# Patient Record
Sex: MALE | Race: WHITE | Employment: FULL TIME | URBAN - METROPOLITAN AREA
[De-identification: names, ages, dates, MRNs, and addresses within clinical notes are randomized per-mention and may not be internally consistent; named-entity substitution may affect disease eponyms.]

---

## 2017-04-18 ENCOUNTER — HOSPITAL ENCOUNTER (EMERGENCY)
Facility: CLINIC | Age: 32
Discharge: LEFT BEFORE DISCHARGE | End: 2017-04-18
Attending: PHYSICIAN ASSISTANT | Admitting: PHYSICIAN ASSISTANT
Payer: COMMERCIAL

## 2017-04-18 VITALS
HEART RATE: 83 BPM | TEMPERATURE: 97.3 F | WEIGHT: 230 LBS | RESPIRATION RATE: 18 BRPM | OXYGEN SATURATION: 99 % | DIASTOLIC BLOOD PRESSURE: 101 MMHG | SYSTOLIC BLOOD PRESSURE: 158 MMHG

## 2017-04-18 DIAGNOSIS — R07.89 CHEST PRESSURE: ICD-10-CM

## 2017-04-18 PROCEDURE — 93005 ELECTROCARDIOGRAM TRACING: CPT

## 2017-04-18 PROCEDURE — 99284 EMERGENCY DEPT VISIT MOD MDM: CPT

## 2017-04-18 ASSESSMENT — ENCOUNTER SYMPTOMS
RHINORRHEA: 0
FEVER: 0
COUGH: 0
NAUSEA: 0
SHORTNESS OF BREATH: 1
SORE THROAT: 0
LIGHT-HEADEDNESS: 0

## 2017-04-18 NOTE — LETTER
[facility] EMERGENCY DEPARTMENT  [address]  [facility] [address]  Phone: [phone]  Fax: [phone]    April 18, 2017        Jacek Ta  [address]  [place] [address]          To whom it may concern:    RE: Jacek Ta    Please excuse Jacek from work yesterday and today due to his discomfort.    Please contact me for questions or concerns.      Sincerely,  María Horner PA-C

## 2017-04-18 NOTE — ED NOTES
Pt presents to ED with midsternal chest pressure that began yesterday. States worse with a deep breath. Denies cardiac hx. ABCs intact. A/Ox3

## 2017-04-18 NOTE — ED PROVIDER NOTES
History     Chief Complaint:  Chest Pressure      HPI   Due to language barrier, a Cameroonian phone  was used during the history-taking and subsequent discussion with this patient.   Jacek Ta is a 31 year old male who presents with chest pressure.  The patient reports midsternal chest pressure which makes it difficult for him to take a full breath.  His symptoms started yesterday and has been constant since then although as the day progressed today, his symptoms have gradually improved.  He denies any actual pain in his chest.  He has no personal or family history of heart disease.  He has had no recent cough, fevers, or upper respiratory symptoms.  He did have a long car ride recently.  He states he really just came in for medication to treat his symptoms and is not interested in other evaluation since he is feeling better.    Cardiac/PE/DVT Risk Factors:  The patient has no history of hypertension, hyperlipidemia or diabetes and is not a smoker.  He reports no family history of heart disease.  He denies any personal or familial history of PE, DVT or clotting disorder.  He has had a long car trip recently.  He reports no recent surgery or other immobilizations.  He is not on hormone therapy and has no known malignancy.     Allergies:  No known drug allergies.     Medications:    The patient is currently on no regular medications.      Past Medical History:    History reviewed.  No significant past medical history.      Past Surgical History:    History reviewed. No pertinent past surgical history.     Family History:    History reviewed. No pertinent family history.     Social History:  Presents to the ED alone.  Tobacco Use: No previous or current tobacco use.  Alcohol Use: Occasional alcohol use.      Review of Systems   Constitutional: Negative for fever.   HENT: Negative for congestion, rhinorrhea and sore throat.    Respiratory: Positive for shortness of breath. Negative for cough.     Cardiovascular: Negative for chest pain.        Positive for chest pressure.   Gastrointestinal: Negative for nausea.   Neurological: Negative for light-headedness.   All other systems reviewed and are negative.    Physical Exam     Patient Vitals for the past 24 hrs:   BP Temp Temp src Pulse Resp SpO2 Weight   04/18/17 1658 (!) 158/101 - - - - 99 % -   04/18/17 1550 (!) 155/101 97.3  F (36.3  C) Temporal 83 18 99 % 104.3 kg (230 lb)        Physical Exam  Nursing note and vitals reviewed.     GENERAL: Alert, mild distress, non toxic appearing.   HEENT: Normal conjunctiva. No scleral icterus. MMM.   NECK: Supple.  CARDIAC: Normal rate and regular rhythm. Normal heart sounds. No murmurs, rubs, or gallops appreciated.  PULMONARY: CTA bilaterally. Normal breath sounds. No wheezing, crackles, or rhonchi appreciated.  ABDOMEN: Soft, non distended abdomen. Non-tender. No rebound or guarding.   NEURO: Alert and oriented. Non-focal.   MUSCULOSKELETAL: Normal range of motion. No peripheral edema. No calf tenderness bilaterally.   SKIN: Skin is warm and dry. No rashes. No pallor or jaundice.   PSYCH: Normal affect and mood.      Emergency Department Course   ECG:  @ 1557  Indication: chest pressure  Vent. Rate 87 bpm. OR interval 154 ms. QRS duration 90 ms. QT/QTc 382/459 ms. P-R-T axis 33 45 34.   Normal sinus rhythm.  Normal ECG.      Emergency Department Course:  EKG was done in triage, interpretation as above.     Nursing notes and vitals reviewed.  I performed an exam of the patient as documented above.     The patient refused further evaluation and left AMA.      Impression & Plan      Medical Decision Making:  Jacek Ta is a 31 year old male who presents with chest pressure but then refused further evaluation in the emergency department as he was feeling much improved.  I discussed with him, using the Guamanian phone , that without further evaluation I cannot rule out a serious or  life-threatening etiology such as acute coronary syndrome, myocardial infarction, pulmonary embolism, acute aortic dissection, myocarditis, pericarditis, acute valvular insufficiency amongst others.  The patient acknowledged this and still desired to leave, stating that he would return to have the recommended testing done if his symptoms return.  In my opinion this patient does have capacity to decide to leave Against Medical Advice, and I have asked him to sign a form indicating that decision.  I have invited the patient to return here at any time if he decides to have further evaluation or treatment, regardless of his ability to pay.     Diagnosis:    ICD-10-CM    1. Chest pressure R07.89    2. Elevated blood pressure I10      Disposition:  Left AMA    Discharge Medications:  None      I, Humera Cole, am serving as a scribe on 4/18/2017 at 4:56 PM to personally document services performed by María Horner PA-C based on my observations and the provider's statements to me.   Humera Cole  4/18/2017   [facility] EMERGENCY DEPARTMENT       María Horner PA-C  04/18/17 1913

## 2017-04-18 NOTE — DISCHARGE INSTRUCTIONS
I cannot rule out life threatening things related to heart including heart attack or blood clot in lungs.   You left before further work up.

## 2017-04-18 NOTE — ED NOTES
Pt left AMA, pt offered to not leave and be evaluated again by provider pt refused. Pt was given work note per provider approval.

## 2017-04-19 LAB — INTERPRETATION ECG - MUSE: NORMAL

## 2017-05-02 ENCOUNTER — HOSPITAL ENCOUNTER (EMERGENCY)
Facility: CLINIC | Age: 32
Discharge: HOME | End: 2017-05-02
Attending: EMERGENCY MEDICINE | Admitting: EMERGENCY MEDICINE
Payer: COMMERCIAL

## 2017-05-02 VITALS
SYSTOLIC BLOOD PRESSURE: 156 MMHG | OXYGEN SATURATION: 98 % | RESPIRATION RATE: 18 BRPM | TEMPERATURE: 98.3 F | DIASTOLIC BLOOD PRESSURE: 98 MMHG | HEART RATE: 90 BPM

## 2017-05-02 DIAGNOSIS — R19.7 DIARRHEA, UNSPECIFIED TYPE: ICD-10-CM

## 2017-05-02 PROCEDURE — 25000125 ZZHC RX 250: Performed by: EMERGENCY MEDICINE

## 2017-05-02 PROCEDURE — 99283 EMERGENCY DEPT VISIT LOW MDM: CPT

## 2017-05-02 PROCEDURE — 25000132 ZZH RX MED GY IP 250 OP 250 PS 637: Performed by: EMERGENCY MEDICINE

## 2017-05-02 RX ORDER — LOPERAMIDE HCL 2 MG
4 CAPSULE ORAL ONCE
Status: COMPLETED | OUTPATIENT
Start: 2017-05-02 | End: 2017-05-02

## 2017-05-02 RX ORDER — SODIUM CHLORIDE 9 MG/ML
1000 INJECTION, SOLUTION INTRAVENOUS CONTINUOUS
Status: DISCONTINUED | OUTPATIENT
Start: 2017-05-02 | End: 2017-05-02 | Stop reason: HOSPADM

## 2017-05-02 RX ORDER — ONDANSETRON 4 MG/1
4 TABLET, ORALLY DISINTEGRATING ORAL EVERY 8 HOURS PRN
Qty: 10 TABLET | Refills: 0 | Status: SHIPPED | OUTPATIENT
Start: 2017-05-02 | End: 2017-05-05

## 2017-05-02 RX ORDER — KETOROLAC TROMETHAMINE 15 MG/ML
15 INJECTION, SOLUTION INTRAMUSCULAR; INTRAVENOUS ONCE
Status: DISCONTINUED | OUTPATIENT
Start: 2017-05-02 | End: 2017-05-02 | Stop reason: HOSPADM

## 2017-05-02 RX ORDER — LOPERAMIDE HYDROCHLORIDE 2 MG/1
TABLET ORAL
Qty: 10 TABLET | Refills: 0 | Status: SHIPPED | OUTPATIENT
Start: 2017-05-02

## 2017-05-02 RX ORDER — ONDANSETRON 4 MG/1
4 TABLET, ORALLY DISINTEGRATING ORAL ONCE
Status: COMPLETED | OUTPATIENT
Start: 2017-05-02 | End: 2017-05-02

## 2017-05-02 RX ORDER — ONDANSETRON 2 MG/ML
4 INJECTION INTRAMUSCULAR; INTRAVENOUS
Status: DISCONTINUED | OUTPATIENT
Start: 2017-05-02 | End: 2017-05-02 | Stop reason: HOSPADM

## 2017-05-02 RX ADMIN — LOPERAMIDE HYDROCHLORIDE 4 MG: 2 CAPSULE ORAL at 11:37

## 2017-05-02 RX ADMIN — ONDANSETRON 4 MG: 4 TABLET, ORALLY DISINTEGRATING ORAL at 10:30

## 2017-05-02 ASSESSMENT — ENCOUNTER SYMPTOMS
DIARRHEA: 1
ABDOMINAL PAIN: 1
VOMITING: 1
NAUSEA: 1

## 2017-05-02 NOTE — ED PROVIDER NOTES
History     Chief Complaint:  Nausea, Vomiting, & Diarrhea    The history is provided by the patient. A  was used (phone ).      Jacek Ta is a 31 year old male who presents with nausea, vomiting, and diarrhea.  Patient had onset of nausea with one episode of emesis and diarrhea yesterday.  He notes having 20 episodes of diarrhea with his last episode being at 0900 today.  The diarrhea does seem to be slowing down through the course of this morning.  He subsequently developed diffuse abdominal pain rated at 1/10 in severity.  He denies fevers, chills, recent antibiotic use, recent foreign travel, or other complaint.     Allergies:  No known drug allergies      Medications:    The patient is not currently taking any prescribed medications.     Past Medical History:    The patient does not have any past pertinent medical history.     Past Surgical History:    History reviewed. No pertinent surgical history.     Family History:    History reviewed. No pertinent family history.      Social History:  Presents alone  Nepali speaking only   Tobacco use: Never  Alcohol use: Positive  PCP: None    Marital Status:         Review of Systems   Gastrointestinal: Positive for abdominal pain, diarrhea, nausea and vomiting.   All other systems reviewed and are negative.      Physical Exam     Patient Vitals for the past 24 hrs:   BP Temp Temp src Pulse Resp SpO2   05/02/17 1152 - - - - 18 -   05/02/17 0936 (!) 156/98 98.3  F (36.8  C) Temporal 90 16 98 %       Physical Exam  General: Sitting on gurney, appears comfortable.  HEENT:   The scalp and head appear normal    The pupils are equal, round, and reactive to light    Extraocular muscles are intact.    The nose is normal.    Dry oral mucosa.       Uvula is in the midline.  There is no peritonsillar abscess.  Neck:  Normal range of motion.    Lungs:  Clear.      No rales, no wheezing.      There is no tachypnea.   Non-labored.  Cardiac: Regular rate.      Normal S1 and S2.      No S3 or S4.      No pathological murmur.      No pericardial rub.  Abdomen: Soft. No distension. No tympani. No rebound. Non-tender.  Lymph: No anterior or posterior cervical lymphadenopathy noted.  MS:  Normal tone.      Normal movement of all extremities.    Neuro:  Normal mentation.  No focal motor or sensory changes.      Speech normal.  Psych:  Awake.     Alert.      Normal affect.      Appropriate interactions.  Skin:  No rash.      No lesions.         Emergency Department Course   Interventions:  1030: Zofran-ODT 4 mg PO   1137: Imodium 4 mg PO    Emergency Department Course:  Past medical records, nursing notes, and vitals reviewed.  1040: I performed an exam of the patient and obtained history as documented above.    Above workup undertaken.  1130: Nurse reported patient is refusing IV and labs requesting only something for diarrhea and a work note.   1133: I rechecked the patient.  Abdomen remains soft and nontender on reexamination.  Findings and plan explained to the Patient. Patient discharged home with instructions regarding supportive care, medications, and reasons to return. The importance of close follow-up was reviewed.      Impression & Plan    Medical Decision Making:  Jacek Ta is a 31 year old male coming in with nausea, vomiting, and diarrhea and was asking for something for nausea and diarrhea as well as a work note.  Patient with a nontender abdomen, he looked well.  He denies cramping or abdominal pain.  I was concerned about the amount of diarrhea he had as he said he went 20 times and wanted to get stool studies and provide him fluids as he looked mildly dehydrated but he refused this stating he only wanted something for the diarrhea and nausea and then a work slip.  I went back in and talked with him some more and reexamined him, he continues to be pain free and has not had anymore diarrhea while here.  I did  give him one dose of Imodium here and gave him 10 tablets to take at home.  He is not to take more than 8 in a 24 hour period and use as directed.  He had no bloody stools that would make me concerned for e-coli 0157 toxin.  I told him that if he develops new symptoms or issues he should return here, otherwise he is to follow up with his primary medical doctor in 48 hours if not fully resolved.    Diagnosis:    ICD-10-CM    1. Diarrhea, unspecified type R19.7        Disposition:  Discharged to home with plan as outlined.    Discharge Medications:  Discharge Medication List as of 5/2/2017 11:43 AM      START taking these medications    Details   loperamide (IMODIUM A-D) 2 MG tablet Take 2 tabs (4 mg) after first loose stool, and then take one tab (2 mg) after each diarrheal stool.  Max of 8 tabs (16 mg) per day., Disp-10 tablet, R-0, Local Print      ondansetron (ZOFRAN ODT) 4 MG ODT tab Take 1 tablet (4 mg) by mouth every 8 hours as needed for nausea, Disp-10 tablet, R-0, Local Print               Wilbert Limon  5/2/2017   [facility] EMERGENCY DEPARTMENT    I, Wilbert Limon, am serving as a scribe at 10:40 AM on 5/2/2017 to document services personally performed by Edwin Cardenas MD based on my observations and the provider's statements to me.       Edwin Cardenas MD  05/02/17 9210

## 2017-05-02 NOTE — ED AVS SNAPSHOT
[facility] Emergency Department    [address]    [facility] [address]    Phone:  [phone]    Fax:  [phone]                                       Jacek Ta   MRN: [id number]    Department:  [facility] Emergency Department   Date of Visit:  5/2/2017           After Visit Summary Signature Page     I have received my discharge instructions, and my questions have been answered. I have discussed any challenges I see with this plan with the nurse or doctor.    ..........................................................................................................................................  Patient/Patient Representative Signature      ..........................................................................................................................................  Patient Representative Print Name and Relationship to Patient    ..................................................               ................................................  Date                                            Time    ..........................................................................................................................................  Reviewed by Signature/Title    ...................................................              ..............................................  Date                                                            Time

## 2017-05-02 NOTE — ED AVS SNAPSHOT
[facility] Emergency Department    [address]    [facility] [address]    Phone:  [phone]    Fax:  [phone]                                       Jacek Ta   MRN: [id number]    Department:  [facility] Emergency Department   Date of Visit:  5/2/2017           Patient Information     Date Of Birth          1985        Your diagnoses for this visit were:     Diarrhea, unspecified type        You were seen by Edwin Cardenas MD.      Follow-up Information     Follow up with [facility] In 2 days.        Discharge Instructions           El vómito y la diarrea: autocuidados  El vómito y la diarrea pueden hacerle sentir muy mal. El estómago y el intestino están reaccionando a un agente irritante, rodrigo algún alimento, un medicamento o fito gastroenteritis viral. El vómito y la diarrea son reacciones mediante las cuales el cuerpo trata de eliminar la causa del problema. La náusea es un síntoma que germán las ganas de comer a fin de nilton al estómago y al intestino tiempo de recuperarse. Para recuperar la normalidad, comience por cuidarse a sí mismo a fin de aliviar brown malestar.  Frieda líquidos    Frieda o tome sorbos de líquido para evitar la deshidratación (pérdida excesiva de agua).    Los líquidos jaison, rodrigo el agua o los caldos, son las mejores opciones.    No tome bebidas con mucha azúcar rodrigo jugos o gaseosas. Éstas pueden empeorar la diarrea.    No tome bebidas para deportistas, tales rodrigo un solución de electrolito, las cuales no tienen la mezcla adecuada de agua, azúcar y minerales, y podrían empeorar los síntomas.    Si la idea de beber algún líquido le repugna, chupe pedazos de hielo.  Cuando pueda volver a comer    A medida que recupere brown apetito, puede volver a brown dieta habitual.    Pregúntele a brown médico si hay algunos alimentos que debe evitar.  Medicamentos    El vómito y la diarrea son mecanismos que el cuerpo usa para deshacerse de  sustancias y microorganismos perjudiciales (tales rodrigo bacterias). NO tome medicamentos antidiarreicos o antieméticos (sustancias que impiden vomitar) a menos que brown proveedor de atención médica le indique que lo zyoa.    La aspirina, los medicamentos que la contienen y muchos de los sustitutos de la aspirina pueden irritar el estómago, por lo que debe evitarlos mientras le dure el trastorno gastrointestinal.    Muchos medicamentos con receta o de venta marlon pueden producir vómito y diarrea. Pregunte a brown médico si alguno de los medicamentos que umu actualmente podría estarle causando estos síntomas.    Ciertos antihistamínicos de venta marlon pueden ayudarle a controlar las náuseas, mientras que otros medicamentos alivian los malestares gastrointestinales. Pregunte a brown proveedor de atención médica qué medicamentos podrían servirle.     Llame a brown médico si tiene alguno de los siguientes síntomas:    Vómito o excrementos sanguinolentos o de color negruzco    Dolor abdominal continuo e intenso    Vómito con dolor de adrián intenso o después de fito lesión en la adrián    Vómito y diarrea al mismo tiempo rosa elena más de fito hora    Imposibilidad rosa elena más de 12  horas de retener siquiera unos sorbos de líquido    Vómitos que mendez más de 24  horas.    Diarrea nayan que dura más de 2 días    Coloración amarillenta de la piel o del wong de los ojos     [id number] [employer]. [address] [place] [address], [place] [address]. Todos los derechos reservados. Esta información no pretende sustituir la atención médica profesional. Sólo brown médico puede diagnosticar y tratar un problema de feliz.      Discharge Instructions  Adult Diarrhea    You have been seen today for diarrhea. This is usually caused by a virus, but some bacteria, parasites, medicines or other medical conditions can cause similar symptoms. At this time your doctor does not find that your diarrhea is a sign of anything dangerous or  life-threatening. However, sometimes the signs of serious illness do not show up right away. If you have new or worse symptoms, you may need to be seen again in the Emergency Department or by your primary doctor.     Return to the Emergency Department if:    You feel you are getting dehydrated, such as being very thirsty, not urinating at least every 8-12 hours, or feeling faint or lightheaded.     You develop a new fever, or your fever continues for more than 2 days.     You have belly pain that seems worse than cramps, is in one spot, or is getting worse over time.     You have blood in your stool or your stool becomes black.  (Remember that if you take Pepto-Bismol , this will turn your stool black).     You feel very weak.    You are not starting to improve within 24 hours of your visit here.    What can I do to help myself?    The most important thing to do is to drink clear liquids.   It is best to have only small, frequent sips of liquids. Drinking too much at once may cause more diarrhea. You should also replace minerals, sodium and potassium lost with diarrhea. Pedialyte  and sports drinks can help you replace these minerals. You can also drink clear liquids such as water, weak tea, apple juice, and 7-Up . Avoid acid liquids (orange), caffeine (coffee) or alcohol. Milk products will make the diarrhea worse.     Eat only bland foods. Soda crackers, toast, plain noodles, gelatin, applesauce and bananas are good first choices. Avoid foods that have acid, are spicy, fatty or fibrous (such as meats, coarse grains, vegetables). You may start eating these foods again in about 3 days when you are better.     Sometimes treatment includes prescription medicine to prevent diarrhea. If your doctor prescribes these for you, take them as directed.     Nonprescription medicine is available for the treatment of diarrhea and can be very effective. If you use it, make sure you use the dose recommended on the package. Check  "with your healthcare provider before you use any medicine for diarrhea.     Don t take ibuprofen, or other nonsteroidal anti-inflammatory medicines without checking with your healthcare provider.   Probiotics: If you have been given an antibiotic, you may want to also take a probiotic pill or eat yogurt with live cultures. Probiotics have \"good bacteria\" to help your intestines stay healthy. Studies have shown that probiotics help prevent diarrhea and other intestine problems (including C. diff infection) when you take antibiotics. You can buy these without a prescription in the pharmacy section of the store.   If you were given a prescription for medicine here today, be sure to read all of the information (including the package insert) that comes with your prescription.  This will include important information about the medicine, its side effects, and any warnings that you need to know about.  The pharmacist who fills the prescription can provide more information and answer questions you may have about the medicine.  If you have questions or concerns that the pharmacist cannot address, please call or return to the Emergency Department.   Opioid Medication Information    Pain medications are among the most commonly prescribed medicines, so we are including this information for all our patients. If you did not receive pain medication or get a prescription for pain medicine, you can ignore it.     You may have been given a prescription for an opioid (narcotic) pain medicine and/or have received a pain medicine while here in the Emergency Department. These medicines can make you drowsy or impaired. You must not drive, operate dangerous equipment, or engage in any other dangerous activities while taking these medications. If you drive while taking these medications, you could be arrested for DUI, or driving under the influence. Do not drink any alcohol while you are taking these medications.     Opioid pain medications " can cause addiction. If you have a history of chemical dependency of any type, you are at a higher risk of becoming addicted to pain medications.  Only take these prescribed medications to treat your pain when all other options have been tried. Take it for as short a time and as few doses as possible. Store your pain pills in a secure place, as they are frequently stolen and provide a dangerous opportunity for children or visitors in your house to start abusing these powerful medications. We will not replace any lost or stolen medicine.  As soon as your pain is better, you should flush all your remaining medication.     Many prescription pain medications contain Tylenol  (acetaminophen), including Vicodin , Tylenol #3 , Norco , Lortab , and Percocet .  You should not take any extra pills of Tylenol  if you are using these prescription medications or you can get very sick.  Do not ever take more than 3000 mg of acetaminophen in any 24 hour period.    All opioids tend to cause constipation. Drink plenty of water and eat foods that have a lot of fiber, such as fruits, vegetables, prune juice, apple juice and high fiber cereal.  Take a laxative if you don t move your bowels at least every other day. Miralax , Milk of Magnesia, Colace , or Senna  can be used to keep you regular.      Remember that you can always come back to the Emergency Department if you are not able to see your regular doctor in the amount of time listed above, if you get any new symptoms, or if there is anything that worries you.      24 Hour Appointment Hotline       To make an appointment at any [facility], call [phone] ([phone]). If you don't have a family doctor or clinic, we will help you find one. [facility] clinics are conveniently located to serve the needs of you and your family.             Review of your medicines      START taking        Dose / Directions Last dose taken    loperamide 2 MG tablet   Commonly known as:   IMODIUM A-D   Quantity:  10 tablet        Take 2 tabs (4 mg) after first loose stool, and then take one tab (2 mg) after each diarrheal stool.  Max of 8 tabs (16 mg) per day.   Refills:  0        ondansetron 4 MG ODT tab   Commonly known as:  ZOFRAN ODT   Dose:  4 mg   Quantity:  10 tablet        Take 1 tablet (4 mg) by mouth every 8 hours as needed for nausea   Refills:  0                Prescriptions were sent or printed at these locations (2 Prescriptions)                   Other Prescriptions                Printed at Department/Unit printer (2 of 2)         loperamide (IMODIUM A-D) 2 MG tablet               ondansetron (ZOFRAN ODT) 4 MG ODT tab                Orders Needing Specimen Collection     Ordered          05/02/17 1056  CBC with platelets differential - STAT, [facility]: STAT, Needs to be Collected     Scheduled Task Status   05/02/17 1056 Collect CBC with platelets differential Open   Order Class:  PCU Collect                05/02/17 1056  Basic metabolic panel - STAT, Prio: STAT, Needs to be Collected     Scheduled Task Status   05/02/17 1057 Collect Basic metabolic panel Open   Order Class:  PCU Collect                  Pending Results     No orders found from 4/30/2017 to 5/3/2017.            Pending Culture Results     No orders found from 4/30/2017 to 5/3/2017.            Pending Results Instructions     If you had any lab results that were not finalized at the time of your Discharge, you can call the ED Lab Result RN at [phone]. You will be contacted by this team for any positive Lab results or changes in treatment. The nurses are available 7 days a week from 10A to 6:30P.  You can leave a message 24 hours per day and they will return your call.        Test Results From Your Hospital Stay               Clinical Quality Measure: Blood Pressure Screening     Your blood pressure was checked while you were in the emergency department today. The last reading we obtained was  BP: (!) 156/98 . Please  "read the guidelines below about what these numbers mean and what you should do about them.  If your systolic blood pressure (the top number) is less than 120 and your diastolic blood pressure (the bottom number) is less than 80, then your blood pressure is normal. There is nothing more that you need to do about it.  If your systolic blood pressure (the top number) is 120-139 or your diastolic blood pressure (the bottom number) is 80-89, your blood pressure may be higher than it should be. You should have your blood pressure rechecked within a year by a primary care provider.  If your systolic blood pressure (the top number) is 140 or greater or your diastolic blood pressure (the bottom number) is 90 or greater, you may have high blood pressure. High blood pressure is treatable, but if left untreated over time it can put you at risk for heart attack, stroke, or kidney failure. You should have your blood pressure rechecked by a primary care provider within the next 4 weeks.  If your provider in the emergency department today gave you specific instructions to follow-up with your doctor or provider even sooner than that, you should follow that instruction and not wait for up to 4 weeks for your follow-up visit.        Thank you for choosing [facility]       Thank you for choosing [facility] for your care. Our goal is always to provide you with excellent care. Hearing back from our patients is one way we can continue to improve our services. Please take a few minutes to complete the written survey that you may receive in the mail after you visit with us. Thank you!        [facility] Information     "[employer] lets you send messages to your doctor, view your test results, renew your prescriptions, schedule appointments and more. To sign up, go to [email or web address] . Click on \"Log in\" on the left side of the screen, which will take you to the Welcome page. Then click on \"Sign up Now\" on the right side of the page.     You " will be asked to enter the access code listed below, as well as some personal information. Please follow the directions to create your username and password.     Your access code is: [email or web address]  Expires: 2017  5:17 PM     Your access code will  in 90 days. If you need help or a new code, please call your [place] clinic or [phone].        Care EveryWhere ID     This is your Care EveryWhere ID. This could be used by other organizations to access your [facility] medical records  [id number]        After Visit Summary       This is your record. Keep this with you and show to your community pharmacist(s) and doctor(s) at your next visit.

## 2017-05-02 NOTE — ED NOTES
In Triage: ABC's intact. Alert and oriented x 3.  Pt c/o abdominal pain and n/v/d since yesterday.

## 2017-05-02 NOTE — DISCHARGE INSTRUCTIONS
El vómito y la diarrea: autocuidados  El vómito y la diarrea pueden hacerle sentir muy mal. El estómago y el intestino están reaccionando a un agente irritante, rodrigo algún alimento, un medicamento o fito gastroenteritis viral. El vómito y la diarrea son reacciones mediante las cuales el cuerpo trata de eliminar la causa del problema. La náusea es un síntoma que germán las ganas de comer a fin de nilton al estómago y al intestino tiempo de recuperarse. Para recuperar la normalidad, comience por cuidarse a sí mismo a fin de aliviar brown malestar.  Frieda líquidos    Frieda o tome sorbos de líquido para evitar la deshidratación (pérdida excesiva de agua).    Los líquidos jaison, rodrigo el agua o los caldos, son las mejores opciones.    No tome bebidas con mucha azúcar rodrigo jugos o gaseosas. Éstas pueden empeorar la diarrea.    No tome bebidas para deportistas, tales rodrigo un solución de electrolito, las cuales no tienen la mezcla adecuada de agua, azúcar y minerales, y podrían empeorar los síntomas.    Si la idea de beber algún líquido le repugna, chupe pedazos de hielo.  Cuando pueda volver a comer    A medida que recupere brown apetito, puede volver a brown dieta habitual.    Pregúntele a brown médico si hay algunos alimentos que debe evitar.  Medicamentos    El vómito y la diarrea son mecanismos que el cuerpo usa para deshacerse de sustancias y microorganismos perjudiciales (tales rodrigo bacterias). NO tome medicamentos antidiarreicos o antieméticos (sustancias que impiden vomitar) a menos que brown proveedor de atención médica le indique que lo zoya.    La aspirina, los medicamentos que la contienen y muchos de los sustitutos de la aspirina pueden irritar el estómago, por lo que debe evitarlos mientras le dure el trastorno gastrointestinal.    Muchos medicamentos con receta o de venta marlon pueden producir vómito y diarrea. Pregunte a brown médico si alguno de los medicamentos que umu actualmente podría estarle causando estos  síntomas.    Ciertos antihistamínicos de venta marlon pueden ayudarle a controlar las náuseas, mientras que otros medicamentos alivian los malestares gastrointestinales. Pregunte a brown proveedor de atención médica qué medicamentos podrían servirle.     Llame a brown médico si tiene alguno de los siguientes síntomas:    Vómito o excrementos sanguinolentos o de color negruzco    Dolor abdominal continuo e intenso    Vómito con dolor de adrián intenso o después de fito lesión en la adrián    Vómito y diarrea al mismo tiempo rosa elena más de fito hora    Imposibilidad rosa elena más de 12  horas de retener siquiera unos sorbos de líquido    Vómitos que mendez más de 24  horas.    Diarrea nayan que dura más de 2 días    Coloración amarillenta de la piel o del wong de los ojos     [id number] [employer]. [address] [place] [id number]. Todos los derechos reservados. Esta información no pretende sustituir la atención médica profesional. Sólo brown médico puede diagnosticar y tratar un problema de feliz.      Discharge Instructions  Adult Diarrhea    You have been seen today for diarrhea. This is usually caused by a virus, but some bacteria, parasites, medicines or other medical conditions can cause similar symptoms. At this time your doctor does not find that your diarrhea is a sign of anything dangerous or life-threatening. However, sometimes the signs of serious illness do not show up right away. If you have new or worse symptoms, you may need to be seen again in the Emergency Department or by your primary doctor.     Return to the Emergency Department if:    You feel you are getting dehydrated, such as being very thirsty, not urinating at least every 8-12 hours, or feeling faint or lightheaded.     You develop a new fever, or your fever continues for more than 2 days.     You have belly pain that seems worse than cramps, is in one spot, or is getting worse over time.     You have blood in your stool or your  "stool becomes black.  (Remember that if you take Pepto-Bismol , this will turn your stool black).     You feel very weak.    You are not starting to improve within 24 hours of your visit here.    What can I do to help myself?    The most important thing to do is to drink clear liquids.   It is best to have only small, frequent sips of liquids. Drinking too much at once may cause more diarrhea. You should also replace minerals, sodium and potassium lost with diarrhea. Pedialyte  and sports drinks can help you replace these minerals. You can also drink clear liquids such as water, weak tea, apple juice, and 7-Up . Avoid acid liquids (orange), caffeine (coffee) or alcohol. Milk products will make the diarrhea worse.     Eat only bland foods. Soda crackers, toast, plain noodles, gelatin, applesauce and bananas are good first choices. Avoid foods that have acid, are spicy, fatty or fibrous (such as meats, coarse grains, vegetables). You may start eating these foods again in about 3 days when you are better.     Sometimes treatment includes prescription medicine to prevent diarrhea. If your doctor prescribes these for you, take them as directed.     Nonprescription medicine is available for the treatment of diarrhea and can be very effective. If you use it, make sure you use the dose recommended on the package. Check with your healthcare provider before you use any medicine for diarrhea.     Don t take ibuprofen, or other nonsteroidal anti-inflammatory medicines without checking with your healthcare provider.   Probiotics: If you have been given an antibiotic, you may want to also take a probiotic pill or eat yogurt with live cultures. Probiotics have \"good bacteria\" to help your intestines stay healthy. Studies have shown that probiotics help prevent diarrhea and other intestine problems (including C. diff infection) when you take antibiotics. You can buy these without a prescription in the pharmacy section of the store. "   If you were given a prescription for medicine here today, be sure to read all of the information (including the package insert) that comes with your prescription.  This will include important information about the medicine, its side effects, and any warnings that you need to know about.  The pharmacist who fills the prescription can provide more information and answer questions you may have about the medicine.  If you have questions or concerns that the pharmacist cannot address, please call or return to the Emergency Department.   Opioid Medication Information    Pain medications are among the most commonly prescribed medicines, so we are including this information for all our patients. If you did not receive pain medication or get a prescription for pain medicine, you can ignore it.     You may have been given a prescription for an opioid (narcotic) pain medicine and/or have received a pain medicine while here in the Emergency Department. These medicines can make you drowsy or impaired. You must not drive, operate dangerous equipment, or engage in any other dangerous activities while taking these medications. If you drive while taking these medications, you could be arrested for DUI, or driving under the influence. Do not drink any alcohol while you are taking these medications.     Opioid pain medications can cause addiction. If you have a history of chemical dependency of any type, you are at a higher risk of becoming addicted to pain medications.  Only take these prescribed medications to treat your pain when all other options have been tried. Take it for as short a time and as few doses as possible. Store your pain pills in a secure place, as they are frequently stolen and provide a dangerous opportunity for children or visitors in your house to start abusing these powerful medications. We will not replace any lost or stolen medicine.  As soon as your pain is better, you should flush all your remaining  medication.     Many prescription pain medications contain Tylenol  (acetaminophen), including Vicodin , Tylenol #3 , Norco , Lortab , and Percocet .  You should not take any extra pills of Tylenol  if you are using these prescription medications or you can get very sick.  Do not ever take more than 3000 mg of acetaminophen in any 24 hour period.    All opioids tend to cause constipation. Drink plenty of water and eat foods that have a lot of fiber, such as fruits, vegetables, prune juice, apple juice and high fiber cereal.  Take a laxative if you don t move your bowels at least every other day. Miralax , Milk of Magnesia, Colace , or Senna  can be used to keep you regular.      Remember that you can always come back to the Emergency Department if you are not able to see your regular doctor in the amount of time listed above, if you get any new symptoms, or if there is anything that worries you.

## 2017-05-02 NOTE — LETTER
[facility] EMERGENCY DEPARTMENT  [address]  [facility] [address]  [phone]    May 2, 2017    Jacek Ta  [address]   [facility] [address]  [phone] (home) none (work)    : 1985      To Whom it may concern:    Jacek Ta was seen in our Emergency Department today, May 2, 2017.  I expect his condition to improve over the next 2 days.  He may return to work/school when improved.    Sincerely,        Edwin Cardenas MD

## 2019-11-23 ENCOUNTER — APPOINTMENT (OUTPATIENT)
Dept: GENERAL RADIOLOGY | Facility: CLINIC | Age: 34
End: 2019-11-23
Attending: EMERGENCY MEDICINE

## 2019-11-23 ENCOUNTER — HOSPITAL ENCOUNTER (EMERGENCY)
Facility: CLINIC | Age: 34
Discharge: HOME | End: 2019-11-23
Attending: EMERGENCY MEDICINE | Admitting: EMERGENCY MEDICINE

## 2019-11-23 VITALS
SYSTOLIC BLOOD PRESSURE: 122 MMHG | RESPIRATION RATE: 20 BRPM | WEIGHT: 227.07 LBS | DIASTOLIC BLOOD PRESSURE: 80 MMHG | HEART RATE: 80 BPM | OXYGEN SATURATION: 100 % | TEMPERATURE: 98.6 F

## 2019-11-23 DIAGNOSIS — R07.9 CHEST PAIN, UNSPECIFIED TYPE: ICD-10-CM

## 2019-11-23 DIAGNOSIS — R00.2 PALPITATIONS: ICD-10-CM

## 2019-11-23 LAB
ANION GAP SERPL CALCULATED.3IONS-SCNC: 8 MMOL/L (ref 3–14)
BASOPHILS # BLD AUTO: 0.1 10E9/L (ref 0–0.2)
BASOPHILS NFR BLD AUTO: 0.6 %
BUN SERPL-MCNC: 11 MG/DL (ref 7–30)
CALCIUM SERPL-MCNC: 9.3 MG/DL (ref 8.5–10.1)
CHLORIDE SERPL-SCNC: 105 MMOL/L (ref 94–109)
CO2 SERPL-SCNC: 23 MMOL/L (ref 20–32)
CREAT SERPL-MCNC: 0.71 MG/DL (ref 0.66–1.25)
D DIMER PPP FEU-MCNC: <0.3 UG/ML FEU (ref 0–0.5)
DIFFERENTIAL METHOD BLD: ABNORMAL
EOSINOPHIL # BLD AUTO: 0 10E9/L (ref 0–0.7)
EOSINOPHIL NFR BLD AUTO: 0.2 %
ERYTHROCYTE [DISTWIDTH] IN BLOOD BY AUTOMATED COUNT: 12.5 % (ref 10–15)
GFR SERPL CREATININE-BSD FRML MDRD: >90 ML/MIN/{1.73_M2}
GLUCOSE SERPL-MCNC: 107 MG/DL (ref 70–99)
HCT VFR BLD AUTO: 46.9 % (ref 40–53)
HGB BLD-MCNC: 16.5 G/DL (ref 13.3–17.7)
IMM GRANULOCYTES # BLD: 0.1 10E9/L (ref 0–0.4)
IMM GRANULOCYTES NFR BLD: 0.5 %
LYMPHOCYTES # BLD AUTO: 2.7 10E9/L (ref 0.8–5.3)
LYMPHOCYTES NFR BLD AUTO: 21.8 %
MCH RBC QN AUTO: 30.8 PG (ref 26.5–33)
MCHC RBC AUTO-ENTMCNC: 35.2 G/DL (ref 31.5–36.5)
MCV RBC AUTO: 88 FL (ref 78–100)
MONOCYTES # BLD AUTO: 1 10E9/L (ref 0–1.3)
MONOCYTES NFR BLD AUTO: 7.8 %
NEUTROPHILS # BLD AUTO: 8.5 10E9/L (ref 1.6–8.3)
NEUTROPHILS NFR BLD AUTO: 69.1 %
NRBC # BLD AUTO: 0 10*3/UL
NRBC BLD AUTO-RTO: 0 /100
PLATELET # BLD AUTO: 339 10E9/L (ref 150–450)
POTASSIUM SERPL-SCNC: 3.8 MMOL/L (ref 3.4–5.3)
RBC # BLD AUTO: 5.36 10E12/L (ref 4.4–5.9)
SODIUM SERPL-SCNC: 136 MMOL/L (ref 133–144)
TROPONIN I SERPL-MCNC: <0.015 UG/L (ref 0–0.04)
WBC # BLD AUTO: 12.3 10E9/L (ref 4–11)

## 2019-11-23 PROCEDURE — 71046 X-RAY EXAM CHEST 2 VIEWS: CPT

## 2019-11-23 PROCEDURE — 93005 ELECTROCARDIOGRAM TRACING: CPT

## 2019-11-23 PROCEDURE — 85379 FIBRIN DEGRADATION QUANT: CPT | Performed by: EMERGENCY MEDICINE

## 2019-11-23 PROCEDURE — 25000132 ZZH RX MED GY IP 250 OP 250 PS 637: Performed by: EMERGENCY MEDICINE

## 2019-11-23 PROCEDURE — 80048 BASIC METABOLIC PNL TOTAL CA: CPT | Performed by: EMERGENCY MEDICINE

## 2019-11-23 PROCEDURE — 85025 COMPLETE CBC W/AUTO DIFF WBC: CPT | Performed by: EMERGENCY MEDICINE

## 2019-11-23 PROCEDURE — 99285 EMERGENCY DEPT VISIT HI MDM: CPT | Mod: 25

## 2019-11-23 PROCEDURE — 84484 ASSAY OF TROPONIN QUANT: CPT | Performed by: EMERGENCY MEDICINE

## 2019-11-23 RX ORDER — ASPIRIN 325 MG
325 TABLET ORAL ONCE
Status: COMPLETED | OUTPATIENT
Start: 2019-11-23 | End: 2019-11-23

## 2019-11-23 RX ADMIN — ASPIRIN 325 MG ORAL TABLET 325 MG: 325 PILL ORAL at 19:23

## 2019-11-23 ASSESSMENT — ENCOUNTER SYMPTOMS
SHORTNESS OF BREATH: 0
COUGH: 0
ABDOMINAL PAIN: 0
VOMITING: 0
PALPITATIONS: 1
BLOOD IN STOOL: 0
HEMATURIA: 0

## 2019-11-23 NOTE — ED AVS SNAPSHOT
[facility] Emergency Department  [address]  [facility] [address]  Phone:  [phone]  Fax:  [phone]                                    Jacek Ta   MRN: [id number]    Department:  [facility] Emergency Department   Date of Visit:  11/23/2019           After Visit Summary Signature Page    I have received my discharge instructions, and my questions have been answered. I have discussed any challenges I see with this plan with the nurse or doctor.    ..........................................................................................................................................  Patient/Patient Representative Signature      ..........................................................................................................................................  Patient Representative Print Name and Relationship to Patient    ..................................................               ................................................  Date                                   Time    ..........................................................................................................................................  Reviewed by Signature/Title    ...................................................              ..............................................  Date                                               Time          22EPIC Rev 08/18

## 2019-11-23 NOTE — LETTER
November 23, 2019      To Whom It May Concern:      Jacek Ta was seen in our Emergency Department today, 11/23/19.  He may return to work without restrictions.    Sincerely,        Isamar Gar MD

## 2019-11-23 NOTE — ED TRIAGE NOTES
Today at 3 pm was at work and developed palpitations and chest pain and felt dizzy.  Palpitations have improved but still has some pain in the chest.  No meds taken today.

## 2019-11-24 NOTE — ED PROVIDER NOTES
History     Chief Complaint:  Chest Pain    A  was used.      Jacek Ta is a 34 year old male who presents to the emergency department for evaluation of chest pain. The patient reports today at 1500 he developed palpitations and non-radiating left-sided chest pain, prompting his presentation to the ED. He reports the episode lasted about an hour. Upon examination in the ED, the patient notes the palpitations and chest pain has subsided, and he feels calmer than he did earlier. He denies abdominal pain, nausea, vomiting, shortness of breath, leg pain or swelling, bloody stool, hematuria, and recent cough or illness. He further denies a recent fall, injury, and travel or long car rides. The patient notes he has never felt palpitations like this in the past, but he has had intermittent chest pain over the last few months. He indicates he has not noticed a pattern to the pain, and it is not correlated with eating. He denies chest pain with taking a deep breath.      Allergies:  NKDA     Medications:    The patient is currently on no regular medications.     Past Medical History:    The patient denies any significant past medical history.     Past Surgical History:    The patient does not have any pertinent past surgical history.     Family History:    No past pertinent family history.     Social History:  Presents with brother.  Never smoker.  Positive for alcohol use.    Negative for drug use.   Marital Status:   [2]     Review of Systems   Respiratory: Negative for cough and shortness of breath.    Cardiovascular: Positive for chest pain and palpitations. Negative for leg swelling.   Gastrointestinal: Negative for abdominal pain, blood in stool and vomiting.   Genitourinary: Negative for hematuria.   All other systems reviewed and are negative.        Physical Exam     Patient Vitals for the past 24 hrs:   BP Temp Temp src Pulse Heart Rate Resp SpO2 Weight   11/23/19 1815 (!)  147/95 -- -- -- 92 22 -- --   11/23/19 1754 (!) 151/100 98.6  F (37  C) Temporal 103 -- 16 100 % 103 kg (227 lb 1.2 oz)      Physical Exam    Gen: alert  HEENT: PERRL, oropharynx clear  Neck: normal ROM  CV: RRR, no murmurs, 2+ distal pulses in all 4 extremities  Chest: no tenderness over the chest wall  Pulm: breath sounds equal, lungs clear  Abd: Soft, nontender  Back: no evidence of injury  MSK: no lower extremity edema, no calf tenderness  Skin: no rash  Neuro: alert, appropriate conversation and interaction    Emergency Department Course     ECG:  Time: 1745  Vent. Rate 85 bpm. WV interval 172. QRS duration 94. QT/QTc 358/426. P-R-T axis 17 28 30.  Normal sinus rhythm.  Normal ECG.  Read time: 1823     Imaging:  Radiographic findings were communicated with the patient and family who voiced understanding of the findings.    XR Chest 2 Views:  No acute airspace disease.   As per radiology.    Laboratory:  1813 Troponin: <0.015    BMP: Glucose 107 (H), o/w WNL (Creatinine: 0.71)     CBC: WBC: 12.3 (H), HGB: 16.5, PLT: 339     D-dimer: <0.3    Interventions:  1923 Aspirin 325 mg PO    Emergency Department Course:  1745 EKG obtained in the ED, see results above.      Nursing notes and vitals reviewed. 2023 I performed an exam of the patient as documented above.     IV Inserted. Medicine administered as documented above. Blood drawn. This was sent to the lab for further testing, results above.    The patient was sent for a XR Chest while in the emergency department, findings above.     1956 I rechecked the patient and discussed the results of his workup thus far. The patient declined a second troponin.     Findings and plan explained to the Patient and brother. Patient discharged home with instructions regarding supportive care, medications, and reasons to return. The importance of close follow-up was reviewed.     I personally reviewed the laboratory results with the Patient and brother and answered all related  questions prior to discharge.    Impression & Plan      Medical Decision Making:  Jacek Ta is a 34 year old male who presents for atypical chest pain and palpitations. Symptoms had resolved prior to arrival. The EKG is non-ischemic. Troponin is negative. Initial troponin was obtained approximately 3 hours after the onset of symptoms. I recommended obtaining delta troponin to increase sensitivity. However, the patient does not want to stay in the ED for this. I discussed the risks and benefits of second troponin in detail with the patient via interpretor. He expressed understanding and declined this second test. The patient is overall lower risk for PE, and troponin was negative. Chest x-ray was obtained and negative for evidence of pneumonia, pneumothorax, effusion, cardiomegaly, other acute process. There was no evidence of predisposition to nefarious arrhythmia on the patient's EKG. No recurrent events while in the ED. Recommended out patient stress test given obesity and elevated blood pressure. Discussed elevated blood pressure with patient, and he will follow up with primary care. Recommended holter monitor given report of palpations. Patient discharged home. Primary care follow up.     Diagnosis:    ICD-10-CM    1. Chest pain, unspecified type R07.9 Echo Stress Echocardiogram   2. Palpitations R00.2 Holter Monitor 48 hour Adult Pediatric     Disposition:  discharged to home    Scribe Disclosure:  I, Ruth Ann Madison, am serving as a scribe on 11/23/2019 at 6:04 PM to personally document services performed by Isamar Gar* based on my observations and the provider's statements to me.      Ruth Ann Madison  11/23/2019   [facility] EMERGENCY DEPARTMENT       Isamar Gar MD  11/23/19 3632

## 2019-11-24 NOTE — DISCHARGE INSTRUCTIONS
Take aspirin 325 mg daily until your stress test follow up appointment.    These are a list of primary care clinics- choose one that works for you and call for an appointment:\  Please make an appointment to follow up with [facility] [phone], [facility] [phone], and [facility] [phone] as soon as possible to follow up on your blood pressure and chest pain and stress test.    An outpatient stress test was ordered for you.  The cardiac testing department will call you to schedule this in the next several days.  Please follow up on this result with your primary care doctor.

## 2019-11-25 LAB — INTERPRETATION ECG - MUSE: NORMAL

## 2021-07-10 ENCOUNTER — HOSPITAL ENCOUNTER (EMERGENCY)
Facility: CLINIC | Age: 36
Discharge: HOME | End: 2021-07-10
Attending: EMERGENCY MEDICINE | Admitting: EMERGENCY MEDICINE

## 2021-07-10 VITALS
DIASTOLIC BLOOD PRESSURE: 82 MMHG | OXYGEN SATURATION: 98 % | WEIGHT: 228.9 LBS | SYSTOLIC BLOOD PRESSURE: 125 MMHG | HEART RATE: 83 BPM | TEMPERATURE: 97.6 F

## 2021-07-10 DIAGNOSIS — R41.82 ALTERED MENTAL STATUS, UNSPECIFIED ALTERED MENTAL STATUS TYPE: ICD-10-CM

## 2021-07-10 DIAGNOSIS — F10.920 ALCOHOLIC INTOXICATION WITHOUT COMPLICATION (H): ICD-10-CM

## 2021-07-10 LAB — ALCOHOL BREATH TEST: 0.26 (ref 0–0.01)

## 2021-07-10 PROCEDURE — 93005 ELECTROCARDIOGRAM TRACING: CPT

## 2021-07-10 PROCEDURE — 99284 EMERGENCY DEPT VISIT MOD MDM: CPT

## 2021-07-10 NOTE — ED PROVIDER NOTES
History   Chief Complaint:  Alcohol Intoxication       HPI   Jacek Ta is a 35 year old male who presents with alcohol intoxication. Per nursing staff, EMS found the patient intoxicated outside of a local bar this evening. Here in the ED, he denies falling or current pain. Denies drug use. Denies trauma.    Review of Systems   Reason unable to perform ROS: Patient intoxicated.     Allergies:  No known allergies    Medications:  Loperamide    Past Medical History:    No known past medical history    Past Surgical History:    No known surgical history    Family History:    No known family history    Social History:  Unaccompanied in ED  Arrives via EMS    Physical Exam     Patient Vitals for the past 24 hrs:   BP Temp Temp src Pulse SpO2   07/10/21 0208 (!) 136/95 97.6  F (36.4  C) Oral 83 99 %       Physical Exam  General: Sitting in bed, urinating on bed, appears intoxicated  Eyes:  The pupils are equal and round    Conjunctivae and sclerae are normal  ENT:    Wearing a mask, no signs of trauma  Neck:  Normal range of motion  CV:  Regular rate, regular rhythm     Skin warm and well perfused   Resp:  Non labored breathing on room air    No tachypnea    No cough heard  GI:  Abdomen is soft, there is no rigidity    No distension    No rebound tenderness     No abdominal tenderness  MS:  Normal muscular tone  Skin:  No rash or acute skin lesions noted  Neuro:   Awake, alert.      Speech is slurred    Face is symmetric.     Moves all extremities equally  Psych:  Flat affect    Emergency Department Course   ECG  ECG taken at 0332, ECG read at 0345  Normal sinus rhythm with sinus arrhythmia  Normal ECG   No significant changes as compared to prior, dated 11/23/2019.  Rate 66 bpm. SD interval 190 ms. QRS duration 96 ms. QT/QTc 386/404 ms. P-R-T axes 20 31 15.     Laboratory:    Alcohol POCT: 0.26 (H)    Emergency Department Course:    Reviewed:  I reviewed nursing notes, vitals, past medical history and care  everywhere    Assessments:  0220 I obtained history and examined the patient as noted above.   0423 I rechecked the patient. He is still too intoxicated to provide a history.     Disposition:  Care of the patient was transferred to my colleague Dr. Miles MD, pending discharge.       Impression & Plan     Medical Decision Making:    Jacek Ta is a 35 year old male presents after being found intoxicated in public and brought in by EMS for evaluation and medical clearance.  Trauma exam is normal. Exam is consistent with isolated alcohol intoxication and his mental status improved though still appears intoxicated. Used [place] interpretor and denies trauma, drug use but admits to alcohol use. Signed out to Dr. Aquino pending discharge once sober.     Diagnosis:    ICD-10-CM    1. Alcoholic intoxication without complication (H)  F10.920    2. Altered mental status, unspecified altered mental status type  R41.82      Scribe Disclosure:  I, Chalino Connell, am serving as a scribe at 2:20 AM on 7/10/2021 to document services personally performed by Clarissa Donato MD based on my observations and the provider's statements to me.          Clarissa Donato MD  07/10/21 0700

## 2021-07-10 NOTE — ED TRIAGE NOTES
Pt got kicked out of bar. PD couldn't find friends, and pt doesn't remember his address. EMS picked up on sitting on a curb by the [place].

## 2021-07-10 NOTE — ED NOTES
Pt signout note     Intoxicated with alcohol, awaiting sobriety. Plan to dc once awake, able to safely care for self.         Sobered as expected, DC home       ICD-10-CM    1. Alcoholic intoxication without complication (H)  F10.920    2. Altered mental status, unspecified altered mental status type  R41.82      MD Miles Chadwick Jerome Richard, MD  07/10/21 0856

## 2021-07-10 NOTE — DISCHARGE INSTRUCTIONS

## 2021-07-11 LAB
ATRIAL RATE - MUSE: 66 BPM
DIASTOLIC BLOOD PRESSURE - MUSE: NORMAL MMHG
INTERPRETATION ECG - MUSE: NORMAL
P AXIS - MUSE: 20 DEGREES
PR INTERVAL - MUSE: 190 MS
QRS DURATION - MUSE: 96 MS
QT - MUSE: 386 MS
QTC - MUSE: 404 MS
R AXIS - MUSE: 31 DEGREES
SYSTOLIC BLOOD PRESSURE - MUSE: NORMAL MMHG
T AXIS - MUSE: 15 DEGREES
VENTRICULAR RATE- MUSE: 66 BPM